# Patient Record
Sex: FEMALE | Race: WHITE | NOT HISPANIC OR LATINO | URBAN - METROPOLITAN AREA
[De-identification: names, ages, dates, MRNs, and addresses within clinical notes are randomized per-mention and may not be internally consistent; named-entity substitution may affect disease eponyms.]

---

## 2018-07-15 ENCOUNTER — EMERGENCY (EMERGENCY)
Facility: HOSPITAL | Age: 63
LOS: 1 days | Discharge: LEFT BEFORE DISCHARGE | End: 2018-07-15
Payer: SELF-PAY

## 2018-07-15 VITALS
TEMPERATURE: 98 F | DIASTOLIC BLOOD PRESSURE: 63 MMHG | SYSTOLIC BLOOD PRESSURE: 100 MMHG | WEIGHT: 145.06 LBS | OXYGEN SATURATION: 99 % | HEIGHT: 65 IN | HEART RATE: 76 BPM | RESPIRATION RATE: 18 BRPM

## 2018-07-15 PROCEDURE — 93010 ELECTROCARDIOGRAM REPORT: CPT

## 2018-07-15 PROCEDURE — 93005 ELECTROCARDIOGRAM TRACING: CPT

## 2018-07-15 PROCEDURE — 82962 GLUCOSE BLOOD TEST: CPT

## 2018-07-15 NOTE — ED ADULT TRIAGE NOTE - CHIEF COMPLAINT QUOTE
ems reports staff at [facility] saw patient fall to table and was unresponsive, as per patient I am hungry and I had vodka, dried blood noted to mouth,  at bedside states found patient at [facility] table  minimally responsive ems reports staff at [facility] saw patient fall to table and was unresponsive, as per patient I am hungry and I had 2 drinks of vodka, dried blood noted to mouth,  Pt alert and oriented x 4 with stable gait  at bedside states found patient at [facility] table  minimally responsive,

## 2018-07-15 NOTE — ED ADULT NURSE NOTE - CHIEF COMPLAINT QUOTE
ems reports staff at [facility] saw patient fall to table and was unresponsive, as per patient I am hungry and I had 2 drinks of vodka, dried blood noted to mouth,  Pt alert and oriented x 4 with stable gait  at bedside states found patient at [facility] table  minimally responsive,
